# Patient Record
Sex: MALE | Race: OTHER | HISPANIC OR LATINO
[De-identification: names, ages, dates, MRNs, and addresses within clinical notes are randomized per-mention and may not be internally consistent; named-entity substitution may affect disease eponyms.]

---

## 2021-06-08 PROBLEM — Z00.00 ENCOUNTER FOR PREVENTIVE HEALTH EXAMINATION: Status: ACTIVE | Noted: 2021-06-08

## 2021-06-09 ENCOUNTER — APPOINTMENT (OUTPATIENT)
Dept: SURGERY | Facility: CLINIC | Age: 56
End: 2021-06-09
Payer: MEDICAID

## 2021-06-09 VITALS
HEIGHT: 71 IN | BODY MASS INDEX: 33.6 KG/M2 | HEART RATE: 63 BPM | TEMPERATURE: 97.7 F | SYSTOLIC BLOOD PRESSURE: 125 MMHG | WEIGHT: 240 LBS | DIASTOLIC BLOOD PRESSURE: 85 MMHG

## 2021-06-09 DIAGNOSIS — Z87.39 PERSONAL HISTORY OF OTHER DISEASES OF THE MUSCULOSKELETAL SYSTEM AND CONNECTIVE TISSUE: ICD-10-CM

## 2021-06-09 DIAGNOSIS — Z86.39 PERSONAL HISTORY OF OTHER ENDOCRINE, NUTRITIONAL AND METABOLIC DISEASE: ICD-10-CM

## 2021-06-09 DIAGNOSIS — Z78.9 OTHER SPECIFIED HEALTH STATUS: ICD-10-CM

## 2021-06-09 PROCEDURE — 99203 OFFICE O/P NEW LOW 30 MIN: CPT

## 2021-06-09 RX ORDER — ACETAMINOPHEN 325 MG/1
TABLET, FILM COATED ORAL
Refills: 0 | Status: ACTIVE | COMMUNITY

## 2021-06-09 RX ORDER — ATORVASTATIN CALCIUM 80 MG/1
TABLET, FILM COATED ORAL
Refills: 0 | Status: ACTIVE | COMMUNITY

## 2021-06-09 NOTE — HISTORY OF PRESENT ILLNESS
[de-identified] : 54 yo M presents for enlarging umbilical hernia he has had for 20 years. He has noticed it is enlarging and the skin overlying it has been changing. He is able to push most of it in without pain. He denies episodes of nausea, vomiting or severe pain. He is a  for work and sometimes needs to lift heavy items.

## 2021-06-09 NOTE — PHYSICAL EXAM
[Respiratory Effort] : normal respiratory effort [Normal Rate and Rhythm] : normal rate and rhythm [No Rash or Lesion] : No rash or lesion [Alert] : alert [Calm] : calm [de-identified] : NAD, well-appearing [de-identified] : Anicteric, MMM [de-identified] : soft, nontender, nondistended; umbilical hernia partially reducible with some incarcerated fat unable to be reduced. Hernia likely ~ 2 cm; mild bluish discoloration of overlying skin but no tenderness, erythema

## 2021-06-09 NOTE — CONSULT LETTER
[Dear  ___] : Dear ~CHIVO, [( Thank you for referring [unfilled] for consultation for _____ )] : Thank you for referring [unfilled] for consultation for [unfilled] [Please see my note below.] : Please see my note below. [Consult Closing:] : Thank you very much for allowing me to participate in the care of this patient.  If you have any questions, please do not hesitate to contact me. [Sincerely,] : Sincerely, [FreeTextEntry3] : Aretha Verdin MD

## 2021-06-09 NOTE — ASSESSMENT
[FreeTextEntry1] : 54 yo M with enlarging umbilical hernia with incarcerated fat. I discussed with him and his wife the options and I have recommended surgery to repair to avoid potential future bowel incarceration. Risks and benefits discussed. Patient would like to proceed with umbilical hernia repair with mesh under general anesthesia.\par Surgery would be at [facility] as an ambulatory procedure.\par Patient will obtain medical clearance. He is vaccinated against COVID19

## 2021-07-16 ENCOUNTER — APPOINTMENT (OUTPATIENT)
Dept: SURGERY | Facility: HOSPITAL | Age: 56
End: 2021-07-16

## 2021-07-28 ENCOUNTER — APPOINTMENT (OUTPATIENT)
Dept: SURGERY | Facility: CLINIC | Age: 56
End: 2021-07-28
Payer: MEDICAID

## 2021-07-28 DIAGNOSIS — K42.0 UMBILICAL HERNIA WITH OBSTRUCTION, W/OUT GANGRENE: ICD-10-CM

## 2021-07-28 PROCEDURE — 99024 POSTOP FOLLOW-UP VISIT: CPT

## 2021-07-28 NOTE — HISTORY OF PRESENT ILLNESS
[de-identified] : Patient returns s/p open umbilical hernia repair with mesh on 7/16/21. He is doing well without any pain, signs of infection, fevers, or changes in bowel habits

## 2021-07-28 NOTE — PHYSICAL EXAM
[Respiratory Effort] : normal respiratory effort [Normal Rate and Rhythm] : normal rate and rhythm [No Rash or Lesion] : No rash or lesion [Alert] : alert [Calm] : calm [de-identified] : NAD, well-appearing [de-identified] : Anicteric [de-identified] : soft, nondistended, nontender with well healing incision c/d/i and no evidence of recurrence

## 2021-07-28 NOTE — CONSULT LETTER
[Dear  ___] : Dear  [unfilled], [Courtesy Letter:] : I had the pleasure of seeing your patient, [unfilled], in my office today. [Consult Closing:] : Thank you very much for allowing me to participate in the care of this patient.  If you have any questions, please do not hesitate to contact me. [Sincerely,] : Sincerely, [FreeTextEntry1] : Enclosed please find my operative report and postop visit note.  [FreeTextEntry3] : Aretha Verdin MD

## 2021-07-28 NOTE — ASSESSMENT
[FreeTextEntry1] : 54 yo M returns for postop visit after umbilical hernia repair with mesh. He is recovering appropriately without acute concerns.

## 2021-07-28 NOTE — PLAN
[FreeTextEntry1] : Avoid heavy lifting for 4 more weeks, otherwise return to normal activity gradually\par Follow up as needed

## 2021-11-22 ENCOUNTER — APPOINTMENT (OUTPATIENT)
Dept: SURGERY | Facility: CLINIC | Age: 56
End: 2021-11-22
Payer: MEDICAID

## 2021-11-22 VITALS
TEMPERATURE: 97.6 F | BODY MASS INDEX: 32.2 KG/M2 | SYSTOLIC BLOOD PRESSURE: 146 MMHG | HEIGHT: 71 IN | HEART RATE: 80 BPM | WEIGHT: 230 LBS | DIASTOLIC BLOOD PRESSURE: 89 MMHG

## 2021-11-22 DIAGNOSIS — R10.33 PERIUMBILICAL PAIN: ICD-10-CM

## 2021-11-22 PROCEDURE — 99213 OFFICE O/P EST LOW 20 MIN: CPT

## 2021-11-22 RX ORDER — TRIAMCINOLONE ACETONIDE 1 MG/G
0.1 CREAM TOPICAL
Qty: 15 | Refills: 0 | Status: ACTIVE | COMMUNITY
Start: 2021-08-25

## 2021-11-22 NOTE — PHYSICAL EXAM
[Respiratory Effort] : normal respiratory effort [Normal Rate and Rhythm] : normal rate and rhythm [Alert] : alert [Calm] : calm [de-identified] : NAD, well-appearing [de-identified] : Anicteric [de-identified] : soft, nontender, nondistended with well-healed umbilical incision and no palpable evidence of recurrence.

## 2021-11-22 NOTE — ASSESSMENT
[FreeTextEntry1] : 56 yo M with umbilical pain after heavy lifting s/p umbilical hernia repair with mesh 5 months ago.\par CT without evidence of recurrence.\par Suspect muscle/scar pull/tear\par Recommend wearing belt or binder at work while lifting heavy items\par Also recommend several days of ATC ibuprofen for anti-inflammatory effect\par Follow up as needed \par

## 2021-11-22 NOTE — HISTORY OF PRESENT ILLNESS
[de-identified] : Patient returns for periumbilical pain that occurred after lifting very heavy items at work about 1 week ago. He did not see any new bulges in the area but presented to the ED for evaluation where CT showed no evidence of umbilical hernia recurrence. He reports that the pain has subsided. He has no other complaints.